# Patient Record
Sex: FEMALE | Race: WHITE | Employment: OTHER | URBAN - METROPOLITAN AREA
[De-identification: names, ages, dates, MRNs, and addresses within clinical notes are randomized per-mention and may not be internally consistent; named-entity substitution may affect disease eponyms.]

---

## 2019-04-16 ENCOUNTER — OFFICE VISIT (OUTPATIENT)
Dept: ORTHOPEDIC SURGERY | Age: 79
End: 2019-04-16
Payer: MEDICARE

## 2019-04-16 VITALS
DIASTOLIC BLOOD PRESSURE: 67 MMHG | HEART RATE: 67 BPM | BODY MASS INDEX: 28.13 KG/M2 | HEIGHT: 66 IN | WEIGHT: 175.04 LBS | SYSTOLIC BLOOD PRESSURE: 132 MMHG

## 2019-04-16 DIAGNOSIS — R29.898 WEAKNESS OF RIGHT HIP: ICD-10-CM

## 2019-04-16 DIAGNOSIS — M25.561 RIGHT KNEE PAIN, UNSPECIFIED CHRONICITY: Primary | ICD-10-CM

## 2019-04-16 PROCEDURE — 99203 OFFICE O/P NEW LOW 30 MIN: CPT | Performed by: ORTHOPAEDIC SURGERY

## 2019-04-16 PROCEDURE — 1036F TOBACCO NON-USER: CPT | Performed by: ORTHOPAEDIC SURGERY

## 2019-04-16 PROCEDURE — G8427 DOCREV CUR MEDS BY ELIG CLIN: HCPCS | Performed by: ORTHOPAEDIC SURGERY

## 2019-04-16 PROCEDURE — 1123F ACP DISCUSS/DSCN MKR DOCD: CPT | Performed by: ORTHOPAEDIC SURGERY

## 2019-04-16 PROCEDURE — G8419 CALC BMI OUT NRM PARAM NOF/U: HCPCS | Performed by: ORTHOPAEDIC SURGERY

## 2019-04-16 PROCEDURE — G8400 PT W/DXA NO RESULTS DOC: HCPCS | Performed by: ORTHOPAEDIC SURGERY

## 2019-04-16 PROCEDURE — 1090F PRES/ABSN URINE INCON ASSESS: CPT | Performed by: ORTHOPAEDIC SURGERY

## 2019-04-16 PROCEDURE — 4040F PNEUMOC VAC/ADMIN/RCVD: CPT | Performed by: ORTHOPAEDIC SURGERY

## 2019-04-16 RX ORDER — ATORVASTATIN CALCIUM 20 MG/1
20 TABLET, FILM COATED ORAL DAILY
COMMUNITY

## 2019-04-16 ASSESSMENT — ENCOUNTER SYMPTOMS
RESPIRATORY NEGATIVE: 1
ALLERGIC/IMMUNOLOGIC NEGATIVE: 1
GASTROINTESTINAL NEGATIVE: 1
EYES NEGATIVE: 1

## 2019-04-16 NOTE — PROGRESS NOTES
Review of Systems   Constitutional: Negative. HENT:        Sinus or throat trouble   Eyes: Negative. Respiratory: Negative. Cardiovascular:        High blood pressure   Gastrointestinal: Negative. Endocrine: Negative. Genitourinary: Negative. Musculoskeletal: Negative. Skin: Negative. Allergic/Immunologic: Negative. Neurological: Negative. Hematological: Negative. Psychiatric/Behavioral: Negative.

## 2019-04-17 NOTE — PROGRESS NOTES
12 [facility]  History and Physical  Knee Pain    Date:  2019    Name:  Krista Alanis  Address:   [place] [address]    :  1940      Age:   78 y.o.    SSN:  [id number]      Medical Record Number:  [id number]    Reason for Visit:    New Patient (RIGHT KNEE)      HPI:   Krista Alanis is a 78y.o. year old female who presents to our office today complaining of  right knee pain. Patient states that over the past 6 months she has noticed some weakness in the right hip and difficulty most particularly with going up steps. She feels that she has to use some effort to pull herself up steps with her arms in regards to her right hip weakness. She denies any recollection of any injury that she can attribute to the symptoms. 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 \"  Patient denies any history of rheumatologic or neurovascular history or family history. She denies much knee pain    Pain Assessment  Location of Pain: Knee  Location Modifiers: Right  Severity of Pain: 2  Quality of Pain: Aching  Duration of Pain: Other (Comment)(weeks)  Aggravating Factors: Stairs, Walking  Relieving Factors: Ice  Result of Injury: No  Work-Related Injury: No  Are there other pain locations you wish to document?: No    Review of Systems:  A 14 point review of systems was completed by the patient on 2019 and is available in the media section of the scanned medical record and was reviewed on 2019. The review is negative with the exception of those things mentioned in the HPI and Past Medical History.       Past History:  Past Medical History:   Diagnosis Date    BP (high blood pressure)      Past Surgical History:   Procedure Laterality Date    HYSTERECTOMY      KNEE ARTHROSCOPY       Current Outpatient Medications on File Prior to Visit   Medication Sig Dispense Refill    atorvastatin (LIPITOR) 20 MG tablet Take 20 mg by mouth daily      amLODIPine (NORVASC) 5 MG tablet 5 mg. No current facility-administered medications on file prior to visit. Social History     Socioeconomic History    Marital status:      Spouse name: Not on file    Number of children: Not on file    Years of education: Not on file    Highest education level: Not on file   Occupational History    Not on file   Social Needs    Financial resource strain: Not on file    Food insecurity:     Worry: Not on file     Inability: Not on file    Transportation needs:     Medical: Not on file     Non-medical: Not on file   Tobacco Use    Smoking status: Never Smoker    Smokeless tobacco: Never Used   Substance and Sexual Activity    Alcohol use: Not on file    Drug use: Never    Sexual activity: Not on file   Lifestyle    Physical activity:     Days per week: Not on file     Minutes per session: Not on file    Stress: Not on file   Relationships    Social connections:     Talks on phone: Not on file     Gets together: Not on file     Attends Latter day service: Not on file     Active member of club or organization: Not on file     Attends meetings of clubs or organizations: Not on file     Relationship status: Not on file    Intimate partner violence:     Fear of current or ex partner: Not on file     Emotionally abused: Not on file     Physically abused: Not on file     Forced sexual activity: Not on file   Other Topics Concern    Not on file   Social History Narrative    Not on file     Family History   Problem Relation Age of Onset    Cancer Father        Current Medications:    Current Outpatient Medications   Medication Sig Dispense Refill    atorvastatin (LIPITOR) 20 MG tablet Take 20 mg by mouth daily      amLODIPine (NORVASC) 5 MG tablet 5 mg. No current facility-administered medications for this visit.         Allergies:  No Known Allergies    Physical Exam:  Vitals:    04/16/19 1446   BP: 132/67   Pulse: 67     General: Iglesia Lemon is a healthy and well appearing 78y.o. year old female who is sitting comfortably in our office in no acute distress. Neuro: alert. oriented  Eyes: Extra-ocular muscles intact  Mouth: Oral mucosa moist. No perioral lesions  Pulm: Respirations unlabored and regular. Heart: Regular rate and rhythm   Skin: warm, well perfused    Knee Examination:    General: Negative Trendelenburg, notable left lower extremity varus thrust.  Right lower extremity minimally longer than the left by approximately three-eighths of an inch. 4/5 strength with right hip flexion, 4/5 strength with right hip abduction. Both of these are notably weaker with comparison to the left.     Right knee:      RIGHT     LEFT  EFFUSION:     [x] NL(4) [] Mild(3) [] Mod(2) [] Sev(1)   [x] NL(4) [] Mild(3) [] Mod(2) [] Sev(1)    TOTAL FLEX:  [x] NL(4) [] Mild(3) [] Mod(2) [] Sev(1)   [x] NL(4) [] Mild(3) [] Mod(2) [] Sev(1)    LACK of EXT:  [x] NL(4) [] Mild(3) [] Mod(2) [] Sev(1)   [x] NL(4) [] Mild(3) [] Mod(2) [] Sev(1)    QUAD WEAK: [x] NL(4) [] Mild(3) [] Mod(2) [] Sev(1)   [x] NL(4) [] Mild(3) [] Mod(2) [] Sev(1)    Points (16)  R:       L:          Tibio Femoral:       RIGHT     LEFT  JT LINE PAIN:     [x] NL(4) [] Mild(3) [] Mod(2) [] Sev(1)   [x] NL(4) [] Mild(3) [] Mod(2) [] Sev(1)    CREPITUS:        [x] NL(4) [] Mild(3) [] Mod(2) [] Sev(1)   [x] NL(4) [] Mild(3) [] Mod(2) [] Sev(1)    COMP PAIN:       [x] NL(4) [] Mild(3) [] Mod(2) [] Sev(1)   [x] NL(4) [] Mild(3) [] Mod(2) [] Sev(1)    Points (12)  R:      L:      Patello Femoral Joint:        RIGHT     LEFT  CREPITUS:                 [x] NL(4) [] Mild(3) [] Mod(2) [] Sev(1)   [x] NL(4) [] Mild(3) [] Mod(2) [] Sev(1)    COMP PAIN:               [x] NL(4) [] Mild(3) [] Mod(2) [] Sev(1)   [x] NL(4) [] Mild(3) [] Mod(2) [] Sev(1)    SOFT TISSUE PAIN:  [x] NL(4) []Mild(3) []Mod(2) [] Sev(1)  Location:    [x] NL(4) [] Mild(3) [] Mod(2) [] Sev(1) [] Mild(3) [] Mod(2) [] Sev(1)     [x] NL(4) [] Mild(3) [] Mod(2) [] Sev(1)    Patellofemoral:                               [x] NL(4) [] Mild(3) [] Mod(2) [] Sev(1)  [x] NL(4) [] Mild(3) [] Mod(2) [] Sev(1)    Alignment:                                      [x] Normal          Degrees                WBL  [x] Normal                 Degrees                WBL   Point: (12)  R:      L:            Laboratory:  No visits with results within 14 Day(s) from this visit. Latest known visit with results is:   No results found for any previous visit. No results found for this or any previous visit (from the past 24 hour(s)). Radiographic:  2 xray views of the  bilateral knee including tunnel and merchant and one lateral view of the right knee taken in our office today reveal no fractures, dislocations, visible tumors, or signs of acute trauma. Anchors from prior ACL replacement noted as well as a pin in the right proximal tibia. Moderate decreased joint space and arthritis noted throughout the medial and lateral compartments of both knees. Patellas have fairly good joint space in the trochlear groove. 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 X-ray imaging was also reviewed with the patient today. Given the patient's history and physical exam the patient really does not exhibit much right knee pain and her main complaint seems to be the right lower extremity weakness. On physical exam the weakness is most notably to the right hip with weakness in right hip flexion and abduction.   Right knee flexion, extension, abduction, and abduction are all equal bilaterally and I do not believe the problem is stemming from the right knee. The patient does have equal sensory function bilaterally with no subjective or objective findings of numbness or paresthesias. Due to the patient's right hip and weakness believe it's in the patient's best interest to receive a neurologic referral to be evaluated by a specialist who can determine appropriate further treatment and testing with regards to the weakness in the right hip. The patient should also undergo physical therapy for strengthening of the muscles of the right lower extremity was particularly the right hip. The patient was educated on other conservative management she can take it home for her condition including safe ambulation both inside and outside the house, light weight low impact exercise, rest, activity modification, and NSAID use as directed on the bottle for pain as needed. Patient should follow-up in the office should she have any other concerns or new developments. Patient was understanding of all instructions and agreeable to this plan. 4/17/19  10:05 AM        Mary James PA-C  Physician Assistant, Orthopaedic Surgery    During this examination, Mary COLE PA-C, functioned as a scribe for Dr. Rosalva Parks. This dictation was performed with a verbal recognition program (DRAGON) and it was checked for errors. It is possible that there are still dictated errors within this office note. If so, please bring any errors to my attention for an addendum. All efforts were made to ensure that this office note is accurate. This dictation was performed with a verbal recognition program (DRAGON) and it was checked for errors. It is possible that there are still dictated errors within this office note. If so, please bring any errors to my attention for an addendum. All efforts were made to ensure that this office note is accurate.     Supervising Physician Attestation:  I, Dr. Rosalva Parks, personally performed the services described in this documentation as scribed above, and it is both accurate and complete and I agree with all pertinent clinical information. I personally interviewed the patient and performed a physical examination and medical decision making. I discussed the patient's condition and treatment options and have  reviewed and agree with the past medical, family and social history unless otherwise noted. All of the patient's questions were answered.       Board Certified Orthopaedic Surgeon  [facility] and [address] and [address] [employer]  Professor of [address]